# Patient Record
Sex: MALE | Race: WHITE | NOT HISPANIC OR LATINO | Employment: FULL TIME | URBAN - METROPOLITAN AREA
[De-identification: names, ages, dates, MRNs, and addresses within clinical notes are randomized per-mention and may not be internally consistent; named-entity substitution may affect disease eponyms.]

---

## 2022-10-13 ENCOUNTER — HOSPITAL ENCOUNTER (EMERGENCY)
Facility: CLINIC | Age: 47
Discharge: HOME | End: 2022-10-13
Attending: INTERNAL MEDICINE | Admitting: INTERNAL MEDICINE
Payer: COMMERCIAL

## 2022-10-13 ENCOUNTER — APPOINTMENT (OUTPATIENT)
Dept: GENERAL RADIOLOGY | Facility: CLINIC | Age: 47
End: 2022-10-13
Attending: INTERNAL MEDICINE
Payer: COMMERCIAL

## 2022-10-13 VITALS
SYSTOLIC BLOOD PRESSURE: 148 MMHG | WEIGHT: 200 LBS | TEMPERATURE: 97.6 F | HEART RATE: 105 BPM | DIASTOLIC BLOOD PRESSURE: 107 MMHG | OXYGEN SATURATION: 97 % | RESPIRATION RATE: 30 BRPM

## 2022-10-13 DIAGNOSIS — K59.00 CONSTIPATION, UNSPECIFIED CONSTIPATION TYPE: ICD-10-CM

## 2022-10-13 PROCEDURE — 74019 RADEX ABDOMEN 2 VIEWS: CPT | Mod: 26 | Performed by: RADIOLOGY

## 2022-10-13 PROCEDURE — 99283 EMERGENCY DEPT VISIT LOW MDM: CPT | Performed by: INTERNAL MEDICINE

## 2022-10-13 PROCEDURE — 250N000013 HC RX MED GY IP 250 OP 250 PS 637: Performed by: INTERNAL MEDICINE

## 2022-10-13 PROCEDURE — 74019 RADEX ABDOMEN 2 VIEWS: CPT

## 2022-10-13 RX ADMIN — DOCUSATE SODIUM 226 ML: 50 LIQUID ORAL at 12:20

## 2022-10-13 ASSESSMENT — ENCOUNTER SYMPTOMS
EYE REDNESS: 0
NECK STIFFNESS: 0
CONFUSION: 0
ABDOMINAL DISTENTION: 1
ARTHRALGIAS: 0
COLOR CHANGE: 0
DIFFICULTY URINATING: 0
SHORTNESS OF BREATH: 0
FEVER: 0
HEADACHES: 0
CONSTIPATION: 1
ABDOMINAL PAIN: 0

## 2022-10-13 ASSESSMENT — ACTIVITIES OF DAILY LIVING (ADL): ADLS_ACUITY_SCORE: 35

## 2022-10-13 NOTE — ED TRIAGE NOTES
"Pt arrives ambulatory to triage w/ c/o constipation. States last bm 3-4 days ago. Tried drinking coffee and eating \"greasy [employer]\" w/ no relief. Pt unable to sit down d/t pain.       "

## 2022-10-13 NOTE — ED PROVIDER NOTES
ED Provider Note  [facility] EMERGENCY DEPARTMENT ([facility])  10/13/22      History     Chief Complaint   Patient presents with     Constipation     The history is provided by the patient and medical records.     Favio Cassidy is a 47 year old otherwise healthy male who presents to the ED for evaluation of constipation for 4-5 days.  The patient reports that it feels like he needs to have a BM but he is unable to go.  He states he has pulled some stool out but his last normal BM was 4-5 days ago.  He denies history of similar symptoms.  He has tried coffee, magnesium, and Fischer's without relief.  He denies history of medical problems or daily medication use.  He denies any new medications but does note that he had the COVID booster this past weekend.  He denies abdominal pain but endorses bloating.    Past Medical History  No past medical history on file.  No past surgical history on file.  No current outpatient medications on file.    Not on File  Family History  No family history on file.  Social History       Past medical history, past surgical history, medications, allergies, family history, and social history were reviewed with the patient. No additional pertinent items.       Review of Systems   Constitutional: Negative for fever.   HENT: Negative for congestion.    Eyes: Negative for redness.   Respiratory: Negative for shortness of breath.    Cardiovascular: Negative for chest pain.   Gastrointestinal: Positive for abdominal distention and constipation. Negative for abdominal pain.   Genitourinary: Negative for difficulty urinating.   Musculoskeletal: Negative for arthralgias and neck stiffness.   Skin: Negative for color change.   Neurological: Negative for headaches.   Psychiatric/Behavioral: Negative for confusion.     A complete review of systems was performed with pertinent positives and negatives noted in the HPI, and all other systems  negative.    Physical Exam   BP: (!) 148/107  Pulse: 105  Temp: 97.6  F (36.4  C)  Resp: 30  Weight: 90.7 kg (200 lb)  SpO2: 97 %  Physical Exam  Constitutional:       General: He is not in acute distress.     Appearance: He is not diaphoretic.   HENT:      Head: Atraumatic.      Mouth/Throat:      Mouth: Oropharynx is clear and moist.   Eyes:      General: No scleral icterus.     Pupils: Pupils are equal, round, and reactive to light.   Cardiovascular:      Rate and Rhythm: Regular rhythm. Tachycardia present.      Pulses: Intact distal pulses.      Heart sounds: Normal heart sounds. No murmur heard.    No friction rub. No gallop.   Pulmonary:      Effort: Pulmonary effort is normal. No respiratory distress.      Breath sounds: Normal breath sounds. No stridor. No wheezing, rhonchi or rales.   Chest:      Chest wall: No tenderness.   Abdominal:      General: Abdomen is flat. Bowel sounds are normal. There is no distension.      Palpations: Abdomen is soft. There is no mass.      Tenderness: There is no abdominal tenderness. There is no right CVA tenderness, left CVA tenderness, guarding or rebound.      Hernia: No hernia is present.   Musculoskeletal:         General: No tenderness or edema.      Cervical back: Neck supple.   Skin:     General: Skin is warm.      Findings: No rash.   Neurological:      General: No focal deficit present.      Cranial Nerves: No cranial nerve deficit.         ED Course     11:54 AM  The patient was seen and examined by Shelia Shi MD in Room [facility].     Procedures                     Results for orders placed or performed during the hospital encounter of 10/13/22   XR Abdomen 2 Views     Status: None    Narrative    Exam: XR ABDOMEN 2 VIEWS, 10/13/2022 1:04 PM    Indication: bloating    Comparison: None    Findings:   Upright and supine views of the abdomen. Nonobstructive bowel gas  pattern. No pneumatosis or free intraperitoneal air. Pelvic  phleboliths. Visualized lung bases are  clear. No gross osseous  abnormality.      Impression    Impression:   Nonobstructive bowel gas pattern.    I have personally reviewed the examination and initial interpretation  and I agree with the findings.    VIRGIE CHAUDHARY MD         SYSTEM ID:  [id number]     Medications   Enema Compound (docusate/mineral oil/NaPhos) NO MAG CIT PREMIX (226 mLs Rectal Given 10/13/22 1220)        Assessments & Plan (with Medical Decision Making)  Constipation, XR without obstructive pattern, pink lady enema with good results, will discharge and follow up with his PMD.       I have reviewed the nursing notes. I have reviewed the findings, diagnosis, plan and need for follow up with the patient.    There are no discharge medications for this patient.      Final diagnoses:   Constipation, unspecified constipation type     I, Luciana Aranda, am serving as a trained medical scribe to document services personally performed by Shelia Shi MD based on the provider's statements to me on October 13, 2022.  This document has been checked and approved by the attending provider.    I, Shelia Shi MD, was physically present and have reviewed and verified the accuracy of this note documented by Luciana Aranda, medical scribe.      --  Shelia Shi Md  [facility] EMERGENCY DEPARTMENT  10/13/2022     Shelia Shi MD  10/13/22 1895